# Patient Record
Sex: MALE | Race: WHITE | NOT HISPANIC OR LATINO | Employment: FULL TIME | URBAN - METROPOLITAN AREA
[De-identification: names, ages, dates, MRNs, and addresses within clinical notes are randomized per-mention and may not be internally consistent; named-entity substitution may affect disease eponyms.]

---

## 2023-10-23 ENCOUNTER — OFFICE VISIT (OUTPATIENT)
Dept: AUDIOLOGY | Facility: OTHER | Age: 63
End: 2023-10-23
Payer: COMMERCIAL

## 2023-10-23 DIAGNOSIS — H90.3 SENSORINEURAL HEARING LOSS, BILATERAL: Primary | ICD-10-CM

## 2023-10-23 PROCEDURE — 92550 TYMPANOMETRY & REFLEX THRESH: CPT | Performed by: AUDIOLOGIST

## 2023-10-23 PROCEDURE — 92557 COMPREHENSIVE HEARING TEST: CPT | Performed by: AUDIOLOGIST

## 2023-10-23 NOTE — PROGRESS NOTES
AUDIOLOGY REPORT    SUBJECTIVE:  Angel Barney is a 62 year old male who was seen in the Audiology Clinic at the [facility] for audiologic evaluation, referred by Heladio Parish MD at the [facility]. The patient reports possibly worsening hearing, noting that his wife has expressed concern. There are also concerns about attention and executive skills, and the patient had recent neuropsychological testing. The patient reports a history of loud noise exposure from firearms and motorcycles. He reports a family history of hearing loss with age, and notes that his brother has hearing loss due to an illness. The patient reports that he does not have tinnitus, ear pain, ear pressure, or a history of ear problems or ear surgery. The patient was accompanied to the appointment by his wife.    OBJECTIVE:    Otoscopic exam indicates ears are clear of cerumen bilaterally     Pure Tone Thresholds assessed using conventional audiometry with good  reliability from 250-8000 Hz bilaterally using insert earphones and circumaural headphones     RIGHT:  mild to moderate sensorineural hearing loss at 4551-2235 Hz with normal hearing sensitivity at all other tested frequencies    LEFT:    mild sensorineural hearing loss at 2592-2068 Hz with normal hearing sensitivity at all other tested frequencies    Tympanogram:    RIGHT: normal eardrum mobility    LEFT:   normal eardrum mobility    Reflexes (reported by stimulus ear):  RIGHT: Ipsilateral is present at normal levels  RIGHT: Contralateral is present at elevated levels   LEFT:   Ipsilateral is present at elevated levels   LEFT:   Contralateral is present at normal levels    Speech Reception Threshold:    RIGHT: 10 dB HL    LEFT:   15 dB HL    Word Recognition Score:     RIGHT: 96% at 55 dB HL using NU-6 recorded word list.    LEFT:   96% at 55 dB HL using NU-6 recorded word list.      ASSESSMENT:     ICD-10-CM    1. Sensorineural hearing  loss, bilateral  [facility]90.3 [facility] Recog ([id number])     Tymps / Reflex   ([id number])          Today s results were discussed with the patient in detail.     PLAN:  Patient was counseled regarding hearing loss and impact on communication. Patient is a borderline candidate for amplification at this time. He may be a candidate for over-the-counter hearing aids. Discussed the relationship between hearing and processing, especially attention and memory. It is likely that neurocognitive factors could make it more difficult to compensate for a mild hearing loss, and mild hearing loss can make cognition appear worse due to mishearing and the cognitive burden of listening with a hearing loss. It is recommended that the patient return as needed. Please call this clinic with questions regarding these results or recommendations.      Jose Love, CCC-A  [place] Licensed Audiologist #[id number]  10/23/2023